# Patient Record
Sex: MALE | Race: WHITE | NOT HISPANIC OR LATINO | URBAN - METROPOLITAN AREA
[De-identification: names, ages, dates, MRNs, and addresses within clinical notes are randomized per-mention and may not be internally consistent; named-entity substitution may affect disease eponyms.]

---

## 2017-02-22 ENCOUNTER — OFFICE VISIT - RIVER FALLS (OUTPATIENT)
Dept: FAMILY MEDICINE | Facility: CLINIC | Age: 62
End: 2017-02-22

## 2017-02-22 ASSESSMENT — MIFFLIN-ST. JEOR: SCORE: 1686.41

## 2017-03-30 ENCOUNTER — OFFICE VISIT - RIVER FALLS (OUTPATIENT)
Dept: FAMILY MEDICINE | Facility: CLINIC | Age: 62
End: 2017-03-30

## 2017-03-30 ASSESSMENT — MIFFLIN-ST. JEOR: SCORE: 1674.61

## 2017-08-24 ENCOUNTER — OFFICE VISIT - RIVER FALLS (OUTPATIENT)
Dept: FAMILY MEDICINE | Facility: CLINIC | Age: 62
End: 2017-08-24

## 2017-08-24 ASSESSMENT — MIFFLIN-ST. JEOR: SCORE: 1669.17

## 2018-05-16 ENCOUNTER — OFFICE VISIT - RIVER FALLS (OUTPATIENT)
Dept: FAMILY MEDICINE | Facility: CLINIC | Age: 63
End: 2018-05-16

## 2018-05-16 ASSESSMENT — MIFFLIN-ST. JEOR: SCORE: 1661.01

## 2018-08-28 ENCOUNTER — OFFICE VISIT - RIVER FALLS (OUTPATIENT)
Dept: FAMILY MEDICINE | Facility: CLINIC | Age: 63
End: 2018-08-28

## 2018-08-28 ASSESSMENT — MIFFLIN-ST. JEOR: SCORE: 1692.76

## 2018-12-10 ENCOUNTER — OFFICE VISIT - RIVER FALLS (OUTPATIENT)
Dept: FAMILY MEDICINE | Facility: CLINIC | Age: 63
End: 2018-12-10

## 2019-05-14 ENCOUNTER — OFFICE VISIT - RIVER FALLS (OUTPATIENT)
Dept: FAMILY MEDICINE | Facility: CLINIC | Age: 64
End: 2019-05-14

## 2019-05-14 ASSESSMENT — MIFFLIN-ST. JEOR: SCORE: 1720.88

## 2019-11-18 ENCOUNTER — OFFICE VISIT - RIVER FALLS (OUTPATIENT)
Dept: FAMILY MEDICINE | Facility: CLINIC | Age: 64
End: 2019-11-18

## 2019-11-18 ASSESSMENT — MIFFLIN-ST. JEOR: SCORE: 1714.53

## 2020-08-03 ENCOUNTER — OFFICE VISIT - RIVER FALLS (OUTPATIENT)
Dept: FAMILY MEDICINE | Facility: CLINIC | Age: 65
End: 2020-08-03

## 2020-08-03 LAB
ALBUMIN UR-MCNC: NEGATIVE G/DL
BILIRUB UR QL STRIP: NEGATIVE
GLUCOSE UR STRIP-MCNC: NEGATIVE MG/DL
HGB UR QL STRIP: NEGATIVE
KETONES UR STRIP-MCNC: NEGATIVE MG/DL
LEUKOCYTE ESTERASE UR QL STRIP: NEGATIVE
NITRATE UR QL: NEGATIVE
PH UR STRIP: 6 [PH] (ref 5–8)
SP GR UR STRIP: 1.02 (ref 1–1.03)

## 2020-08-03 ASSESSMENT — MIFFLIN-ST. JEOR: SCORE: 1678.24

## 2022-02-11 VITALS
BODY MASS INDEX: 33.24 KG/M2 | WEIGHT: 211.8 LBS | HEART RATE: 68 BPM | DIASTOLIC BLOOD PRESSURE: 70 MMHG | HEIGHT: 67 IN | TEMPERATURE: 98.6 F | SYSTOLIC BLOOD PRESSURE: 110 MMHG

## 2022-02-11 VITALS
HEART RATE: 80 BPM | WEIGHT: 210.8 LBS | BODY MASS INDEX: 33.51 KG/M2 | SYSTOLIC BLOOD PRESSURE: 116 MMHG | DIASTOLIC BLOOD PRESSURE: 70 MMHG | TEMPERATURE: 97.3 F

## 2022-02-11 VITALS
WEIGHT: 203 LBS | OXYGEN SATURATION: 93 % | DIASTOLIC BLOOD PRESSURE: 66 MMHG | HEART RATE: 102 BPM | HEIGHT: 69 IN | BODY MASS INDEX: 30.07 KG/M2 | SYSTOLIC BLOOD PRESSURE: 102 MMHG

## 2022-02-11 VITALS
TEMPERATURE: 97.9 F | HEART RATE: 60 BPM | HEIGHT: 67 IN | SYSTOLIC BLOOD PRESSURE: 106 MMHG | WEIGHT: 209.2 LBS | BODY MASS INDEX: 32.83 KG/M2 | DIASTOLIC BLOOD PRESSURE: 72 MMHG

## 2022-02-11 VITALS
BODY MASS INDEX: 32.65 KG/M2 | DIASTOLIC BLOOD PRESSURE: 72 MMHG | TEMPERATURE: 96.8 F | WEIGHT: 208 LBS | HEART RATE: 60 BPM | SYSTOLIC BLOOD PRESSURE: 108 MMHG | HEIGHT: 67 IN | OXYGEN SATURATION: 98 %

## 2022-02-11 VITALS
TEMPERATURE: 97.6 F | HEIGHT: 69 IN | HEART RATE: 72 BPM | BODY MASS INDEX: 31.25 KG/M2 | SYSTOLIC BLOOD PRESSURE: 112 MMHG | DIASTOLIC BLOOD PRESSURE: 68 MMHG | WEIGHT: 211 LBS

## 2022-02-11 VITALS
TEMPERATURE: 97.6 F | BODY MASS INDEX: 31.46 KG/M2 | HEIGHT: 69 IN | SYSTOLIC BLOOD PRESSURE: 112 MMHG | WEIGHT: 212.4 LBS | HEART RATE: 58 BPM | DIASTOLIC BLOOD PRESSURE: 60 MMHG

## 2022-02-11 VITALS
DIASTOLIC BLOOD PRESSURE: 66 MMHG | WEIGHT: 206.2 LBS | OXYGEN SATURATION: 96 % | TEMPERATURE: 97.1 F | HEIGHT: 67 IN | SYSTOLIC BLOOD PRESSURE: 104 MMHG | BODY MASS INDEX: 32.36 KG/M2 | HEART RATE: 63 BPM

## 2022-02-11 VITALS
HEIGHT: 67 IN | HEART RATE: 66 BPM | TEMPERATURE: 96.7 F | SYSTOLIC BLOOD PRESSURE: 108 MMHG | WEIGHT: 213.2 LBS | BODY MASS INDEX: 33.46 KG/M2 | DIASTOLIC BLOOD PRESSURE: 70 MMHG

## 2022-02-16 NOTE — TELEPHONE ENCOUNTER
Order for EEG faxed to [facility], patient is aware they will contact him to schedule.

## 2022-02-16 NOTE — NURSING NOTE
Phone Message    PCP:   TIMIT      Time of Call:  10:39 am       Person Calling:  pt  Phone number:  [phone]    Returned call at: 10:46 am    Note:   Pt calls requesting a prescription for a fungus on his leg. Has been seen this spring for this rash, but it keeps coming back. States he had an RN look at is and she thinks it is a fungus and should have a differnet rx. Advised appt with CHT, transferred to schedule.

## 2022-02-16 NOTE — CARE COORDINATION
Patient:   JULY KEARNS JR            MRN: [id number]            FIN: [id number]               Age:   64 years     Sex:  Male     :  1955   Associated Diagnoses:   None   Author:   Brooke Tan CMA      Sources of Information:  [ ] Patient, family member, or caregiver (Please list):  [x ] Hospital discharge summary  [ ] Hospital fax  [ ] List of recent hospitalizations or ED visits  [ ] Other:     Discharged From: [facility]  Discharge Date: 5/10/19    Diagnosis/Problem: Syncope    Medication Changes: [ ] Yes [x ] No   Medication List Updated: [ ] Yes [ x] No  Hospital medication list reconciled with clinic medication list: Yes  Medications          *denotes recorded medication          *Aspir-Low 81 mg oral delayed release tablet: 81 mg, 1 tab(s), Oral, daily, 0 Refill(s).          Elocon 0.1% topical cream: 1 nishant, TOP, Daily, 30 g, 1 Refill(s).      Needs Referral or Lab: [ x] Yes [ ] No  Needs to get scheduled for outpatient EEG    Needs Follow-up Appointment:  [x ] Within 7 days of discharge (highly complex visit)  [ ] Within 14 days of discharge (moderately complex visit)    Appointment Made With: No appt yet  Date:     Called and spoke to pt-he had no concerns at this time. Discussed seeing CHT for f/u and to get scheduled for outpatient EEG. He agreed to see CHT and I have transferred him to schedule.F/u appointment completed with CHT  Magi Lopez CMA.

## 2022-02-16 NOTE — PROGRESS NOTES
Patient:   JULY KEARNS JR            MRN: [id number]            FIN: [id number]               Age:   64 years     Sex:  Male     :  1955   Associated Diagnoses:   Right otitis externa; Impacted cerumen, right ear   Author:   Yosef Pozo MD      Visit Information      Date of Service: 2019 10:17 am  Performing Location: [facility]  Encounter#: [id number]      Primary Care Provider (PCP):  Yosef Pozo MD    NPI# [id number]      Referring Provider:  Yosef Pozo MD    NPI# [id number]      Chief Complaint   2019 10:19 AM CST  Right ear pain since last thursday, Has been using ear wax removal kit, seems to have made it worse     Chief complaint and symptoms noted above confirmed with patient.      History of Present Illness             The patient presents with earache.  The location of the earache is the right ear.  The earache is characterized by pain.  The severity of the earache is moderate.  The earache is constant.  Exacerbating factors consist of movement.  Relieving factors consist of none.  Associated symptoms consist of none.        Review of Systems   Constitutional:  Negative.    Ear/Nose/Mouth/Throat:  Negative except as documented in history of present illness.    Respiratory:  Negative.    Cardiovascular:  Negative.       Health Status   Allergies:    Allergic Reactions (Selected)  No Known Medication Allergies   Problem list:    All Problems (Selected)  Episode of moderate major depression / SNOMED CT 47342938 / Confirmed  Obesity / ICD-9-.00 / Probable      Histories   Past Medical History:    Active  Obesity (ICD-9-.00)  Episode of moderate major depression (SNOMED CT 59217809)  Resolved  Inpatient stay (SNOMED CT 895444433): Onset on 2019 at 64 years.  Resolved on 5/10/2019 at 64 years.  Comments:  2019 CDT 7:19 AM CDT - Terri Brooks  @[facility], [place] - Syncope   Family History:    Cancer  Brother  [place] -  Cancer of colon  Grandfather (M)  Diverticulitis  Mother (Lucy)     Procedure history:    Colonoscopy (SNOMED CT 831451144) performed by MD Mao Ott on 4/25/2002 at 46 Years.  Comments:  10/17/2011 6:35 AM CDT - ShawVy hernandez  Repeat in 10 yrs.  Flexible sigmoidoscopy (SNOMED CT 83775280) performed by Buzz Santana MD on 3/9/2001 at 45 Years.  Carpal tunnel decompression (SNOMED CT 4290871735).   Social History:        Alcohol Assessment: Denies Alcohol Use            Current, Beer (12 oz), Liquor (Hard) (1.5 oz), Stopped age 34 Years.      Tobacco Assessment: Past            Past, OTHER, 20 year(s).  Started age 14 Years.  Stopped age 34 Years.      Employment and Education Assessment            Employed      Home and Environment Assessment            Marital status: Single.  Lives with Self.  1 children.  Living situation: Home/Independent.  Smoker in               household: No.      Nutrition and Health Assessment            Type of diet: Regular.      Exercise and Physical Activity Assessment            Exercise frequency: Daily.  Exercise type: Walking.        Physical Examination   Vital Signs   11/18/2019 10:19 AM CST Temperature Tympanic 97.6 DegF  LOW    Peripheral Pulse Rate 72 bpm    Pulse Site Radial artery    HR Method Manual    Systolic Blood Pressure 112 mmHg    Diastolic Blood Pressure 68 mmHg    Mean Arterial Pressure 83 mmHg    BP Site Left arm    BP Method Manual      Measurements from flowsheet : Measurements   11/18/2019 10:19 AM CST Height Measured - Standard 68.5 in    Weight Measured - Standard 211 lb    BSA 2.15 m2    Body Mass Index 31.61 kg/m2  HI      Vital Signs Stable per flow sheet .   General:  Alert and oriented, No acute distress.    HENT:  Normocephalic.         Ear: Right ear, Canal ( Impacted with cerumen, Erythematous, Pain with pinna motion ).    Neck:  Supple, Non-tender, No lymphadenopathy.    Respiratory:  Lungs are clear to auscultation, Respirations are non-labored.     Cardiovascular:  Normal rate, Regular rhythm.       Procedure   Ear foreign body removal procedure   Date/ Time:  11/18/2019 10:45:00 AM.     Confirmed: patient.     Performed by: self.     Informed consent: Verbally obtained.     Indication: hearing disturbance, impacted Cerumen.     Location: right ear.     Preparation and technique: positioned sitting upright, method including (cerumen loop, curette, irrigation with warm tap water, otologic syringe).     Results: foreign body removal complete.     Procedure tolerated: well.     No Complications.        Impression and Plan   Diagnosis     Right otitis externa (UAA74-OL H60.91).     Course:  Not improving.    Orders     Orders   Pharmacy:  hydrocortisone/neomycin/polymyxin B 1%-0.35%-10,000 units/mL otic suspension (Prescribe): 2 drop(s), Ear-Right, qid, x 10 day(s), # 10 mL, 0 Refill(s), Type: Acute, Pharmacy: [facility] , 2 drop(s) Ear-Right qid,x10 day(s).     Diagnosis     Impacted cerumen, right ear (SOQ95-KK H61.21).     Orders     F/U  if not improving, sooner if getting worse.

## 2022-02-16 NOTE — PROGRESS NOTES
"   Patient:   JULY KEARNS JR            MRN: [id number]            FIN: [id number]               Age:   62 years     Sex:  Male     :  1955   Associated Diagnoses:   Rash   Author:   Yosef Pozo MD      Chief Complaint   2017 4:27 PM CDT    Patient here for itchy \"fungus\" rash on bilateral legs, arms, and flank. Patient has tried hydroxyzine HCL without relief.     Chief complaint and symptoms noted above confirmed with patient.      History of Present Illness             The patient presents with rash.  The location of the rash is on the right, leg.  The severity of the symptom(s) associated to the rash is mild.  The timing/ course of symptom(s) related to the rash is intermittent.  Exacerbating factors consist of none.  Relieving factors consist of antihistamine.        Review of Systems   Constitutional:  Negative.    Integumentary:  Negative except as documented in history of present illness.       Health Status   Allergies:    Allergic Reactions (Selected)  No Known Medication Allergies   Medications:  (Selected)   Prescriptions  Prescribed  hydrOXYzine hydrochloride 25 mg oral tablet: 1 tab(s) ( 25 mg ), PO, QID, PRN: for itching, # 120 tab(s), 1 Refill(s), Type: Maintenance, Pharmacy: [facility] #[id number], 1 tab(s) po qid,x30 day(s),PRN:for itching   Problem list:    All Problems (Selected)  Episode of moderate major depression / ICD-9-.22 / Confirmed  Obesity / ICD-9-.00 / Probable      Histories   Past Medical History:    Active  Obesity (ICD-9-.00)   Family History:    Cancer  Brother  [place] - Cancer of colon  Grandfather (M)  Diverticulitis  Mother (Lucy)     Procedure history:    Colonoscopy (SNOMED CT 727186692) performed by MD Mao Ott on 2002 at 46 Years.  Comments:  10/17/2011 6:35 AM - Vy Squires  Repeat in 10 yrs.  Flexible sigmoidoscopy (SNOMED CT 60551406) performed by Buzz Santana MD on 3/9/2001 at 45 Years.  Carpal tunnel decompression (SNOMED CT " 8028196942).   Social History:        Alcohol Assessment: Denies Alcohol Use            Current, Beer (12 oz), Liquor (Hard) (1.5 oz), Stopped age 34 Years.      Tobacco Assessment: Past            Past, OTHER, 20 year(s).  Started age 14 Years.  Stopped age 34 Years.      Employment and Education Assessment            Employed      Home and Environment Assessment            Marital status: Single.  Lives with Self.  1 children.  Living situation: Home/Independent.  Smoker in               household: No.      Nutrition and Health Assessment            Type of diet: Regular.      Exercise and Physical Activity Assessment            Exercise frequency: Daily.  Exercise type: Walking.        Physical Examination   Vital Signs   8/24/2017 4:27 PM CDT Temperature Tympanic 96.8 DegF  LOW    Peripheral Pulse Rate 60 bpm    Systolic Blood Pressure 108 mmHg    Diastolic Blood Pressure 72 mmHg    Mean Arterial Pressure 84 mmHg    Oxygen Saturation 98 %      Measurements from flowsheet : Measurements   8/24/2017 4:27 PM CDT Height Measured - Standard 66.5 in    Weight Measured - Standard 208.0 lb    BSA 2.1 m2    Body Mass Index 33.07 kg/m2      General:  Alert and oriented, No acute distress.    Respiratory:  Lungs are clear to auscultation.    Cardiovascular:  Normal rate, Regular rhythm.    Integumentary:       Integumentary exam: Site # 1, Right, Leg, Scaly rash 8x8 cm.       Impression and Plan   Diagnosis     Rash (LYH80-XA R21).     Orders     Orders   Pharmacy:  Lotrisone 1%-0.05% topical cream (Prescribe): 1 nishant, TOP, BID, # 45 g, 0 Refill(s), Type: Maintenance, Pharmacy: [facility] #[id number], 1 nishant top bid.     Orders   Pharmacy:  hydrOXYzine hydrochloride 25 mg oral tablet (Prescribe): 1 tab(s) ( 25 mg ), PO, QID, x 30 day(s), PRN: for itching, # 120 tab(s), 1 Refill(s), Type: Maintenance, Pharmacy: [facility] #[id number], 1 tab(s) po qid,x30 day(s),PRN:for itching  hydrOXYzine hydrochloride 25 mg oral tablet  (Modify): 1 tab(s) ( 25 mg ), PO, QID, x 30 day(s), PRN: for itching, # 120 tab(s), 1 Refill(s), Type: Hard Stop, Pharmacy: [facility] #[id number].     Orders   Requests (Consults / Referrals):  Referral (Request) (Order): 8/24/2017 4:49 PM CDT, Referred to: Dermatology, Reason for referral: Rash on leg, Rash.

## 2022-02-16 NOTE — TELEPHONE ENCOUNTER
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   Returned call- pt says he has physical scheduled on Monday. Pt says a coworker told him that another coworker (does not work at some location- only comes to shop to  supplies) was exposed to COVID and was tested.    Pt says his boss told  him that coworker was tested 2.5 weeks ago and came back negative. pt wondering if he can still come in for his DOT physical. Pt says he never has never had direct contact with tested coworker. Told pt if he has had no contact and coworker was negative he can still come in for DOT.    Last office visit and reason:  11/18/19 Earache, [facility] Ear lavage

## 2022-02-16 NOTE — PROGRESS NOTES
Patient:   JULY KEARNS JR            MRN: [id number]            FIN: [id number]               Age:   63 years     Sex:  Male     :  1955   Associated Diagnoses:   TMJ tenderness, right   Author:   Herbie Sibley PA-C      Report Summary   Diagnosis  TMJ tenderness, right (CVK69-CU M26.621).  Patient Instructions   Visit Information   Visit type:  General concerns.    Accompanied by:  No one.    Source of history:  Self.    Referral source:  Self.    History limitation:  None.       Chief Complaint   12/10/2018 2:35 PM CST   ears crackling, achy and throbbing x 1 week        History of Present Illness             The patient presents with temporomandibular joint dysfunction.  The location of TMJ dysfunction symptom(s) is the right side of the jaw.  The TMJ dysfunction is characterized by jaw popping, jaw clicking and aching pain.  The severity of the symptom(s) of TMJ dysfunction is moderate.  The symptom(s) of TMJ dysfunction is episodic, fluctuates in intensity and is worsening.  The symptom(s) of TMJ dysfunction has lasted for 1 week(s).  No URI symptoms. Right sided. CC above noted and confirmed with the patient..        Review of Systems   Constitutional:  Negative.    Ear/Nose/Mouth/Throat:  Negative except as documented in history of present illness.    Musculoskeletal:  Negative except as documented in history of present illness.       Health Status   Allergies:    Allergic Reactions (All)  No Known Medication Allergies  Canceled/Inactive Reactions (All)  No known allergies   Medications:  (Selected)   Prescriptions  Prescribed  Elocon 0.1% topical cream: 1 nishant, TOP, Daily, # 30 g, 1 Refill(s), Type: Maintenance, Pharmacy: [facility] #[id number], 1 nishant Topical daily  Documented Medications  Documented  Aspir-Low 81 mg oral delayed release tablet: = 1 tab(s) ( 81 mg ), Oral, daily, 0 Refill(s), Type: Maintenance   Problem list:    All Problems (Selected)  Obesity / ICD-9-.00 / Probable  Episode of  moderate major depression / ICD-9-.22 / Confirmed      Histories   Past Medical History:    Active  Obesity (278.00)      Physical Examination   Vital Signs   12/10/2018 2:35 PM CST Temperature Tympanic 97.3 DegF  LOW    Peripheral Pulse Rate 80 bpm    Pulse Site Radial artery    HR Method Manual    Systolic Blood Pressure 116 mmHg    Diastolic Blood Pressure 70 mmHg    Mean Arterial Pressure 85 mmHg    BP Site Right arm    BP Method Manual      Measurements from flowsheet : Measurements   12/10/2018 2:35 PM CST   Weight Measured - Standard                210.8 lb     Eye:  Pupils are equal, round and reactive to light, Extraocular movements are intact, Normal conjunctiva.    HENT:  Normocephalic, Tympanic membranes are clear, Oral mucosa is moist, No pharyngeal erythema.    Musculoskeletal:  Right TMJ tenderness to palpation. No locking..       Impression and Plan   Diagnosis     TMJ tenderness, right (AED37-ZN M26.621).     Patient Instructions:       Counseled: Patient, Regarding diagnosis, Regarding medications, Activity.    Ice and or heat to jaw. soft foods. Mouth guard  FU PRN.

## 2022-02-16 NOTE — PROGRESS NOTES
Patient:   JULY KEARNS JR            MRN: [id number]            FIN: [id number]               Age:   61 years     Sex:  Male     :  1955   Associated Diagnoses:   Contact dermatitis   Author:   Herbie Sibley PA-C      Visit Information   Visit type:  General concerns.    Accompanied by:  No one.    Source of history:  Self.    Referral source:  Self.    History limitation:  None.       Chief Complaint   2017 9:24 AM CST    c/o red rash all over x 3 wks        History of Present Illness             The patient presents with rash.  The location of the rash is bilaterally on the, generalized.  The rash is described as swollen, red and itching.  The severity of the symptom(s) associated to the rash is moderate.  The timing/ course of symptom(s) related to the rash is intermittent and worsening.  The rash has lasted for 3 week(s).  Possible ringworm on legs. 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 Intense pruritus. Remodeling house. Exposure to housing materials. Much stress. No medications. No other new exposures. CC above noted and confirmed with the patient..        Review of Systems   Constitutional:  Negative except as documented in history of present illness.    Respiratory:  Negative.    Cardiovascular:  Negative.    Integumentary:  Negative except as documented in history of present illness.    Neurologic:  Negative except as documented in history of present illness.       Health Status   Allergies:    Allergic Reactions (All)  No known allergies   Problem list:    All Problems (Selected)  Episode of moderate major depression / ICD-9-.22 / Confirmed  Obesity / ICD-9-.00 / Probable   Medications:  (Selected)   Prescriptions  Prescribed  predniSONE 10 mg oral tablet: 4 tabs daily x 4, then 3 tabs daily x 4, then 2 tabs daily x 4, then 1 tab daily x 4., PO, Daily, # 40 tab(s), 0 Refill(s), Type: Maintenance, Pharmacy: [facility] #[id number], 4 tabs daily x 4, then 3 tabs daily x 4, then  2 tabs daily x 4, then 1 tab...  terbinafine 250 mg oral tablet: 1 tab(s) ( 250 mg ), PO, Daily, x 10 day(s), # 10 tab(s), 0 Refill(s), Type: Acute, Pharmacy: [facility] #[id number], 1 tab(s) po daily,x10 day(s)      Histories   Past Medical History:    Active  Obesity (278.00)   Family History:    Cancer  Brother  [place] - Cancer of colon  Grandfather (M)  Diverticulitis  Mother (Lucy)     Procedure history:    Colonoscopy (432094604) on 4/25/2002 at 46 Years.  Comments:  10/17/2011 6:35 AM - Vy Squires  Repeat in 10 yrs.  Flexible sigmoidoscopy (97539662) on 3/9/2001 at 45 Years.  Carpal tunnel decompression (8280594689).   Social History:        Alcohol Assessment: Denies Alcohol Use            Current, Beer (12 oz), Liquor (Hard) (1.5 oz), Stopped age 34 Years.      Tobacco Assessment: Past            Past, OTHER, 20 year(s).  Started age 14 Years.  Stopped age 34 Years.      Employment and Education Assessment            Employed      Home and Environment Assessment            Marital status: Single.  Lives with Self.  1 children.  Living situation: Home/Independent.  Smoker in               household: No.      Nutrition and Health Assessment            Type of diet: Regular.      Exercise and Physical Activity Assessment            Exercise frequency: Daily.  Exercise type: Walking.        Physical Examination   Vital Signs   2/22/2017 9:24 AM CST Temperature Temporal 98.6 DegF    Peripheral Pulse Rate 68 bpm    Pulse Site Radial artery    Systolic Blood Pressure 110 mmHg    Diastolic Blood Pressure 70 mmHg    Mean Arterial Pressure 83 mmHg    BP Site Right arm    BP Method Manual      Measurements from flowsheet : Measurements   2/22/2017 9:24 AM CST Height Measured - Standard 66.5 in    Weight Measured - Standard 211.8 lb    BSA 2.12 m2    Body Mass Index 33.67 kg/m2      General:  Alert and oriented, No acute distress.    Neck:  Supple, Non-tender, No lymphadenopathy.    Respiratory:  Respirations are  non-labored.    Cardiovascular:  Normal rate.    Integumentary:  Warm, Intact, Circular erythematous rash on legs with some scale. Erythematous papular rash on upper arms and trunk..    Psychiatric:  Cooperative, Appropriate mood & affect.       Impression and Plan   Diagnosis     Contact dermatitis (MRI13-WT L25.9).     Patient Instructions:       Counseled: Patient, Regarding diagnosis, Regarding medications, Activity, Verbalized understanding.    Orders     Orders (Selected)   Prescriptions  Prescribed  predniSONE 10 mg oral tablet: 4 tabs daily x 4, then 3 tabs daily x 4, then 2 tabs daily x 4, then 1 tab daily x 4., PO, Daily, # 40 tab(s), 0 Refill(s), Type: Maintenance, Pharmacy: [facility] #[id number], 4 tabs daily x 4, then 3 tabs daily x 4, then 2 tabs daily x 4, then 1 tab...  terbinafine 250 mg oral tablet: 1 tab(s) ( 250 mg ), PO, Daily, x 10 day(s), # 10 tab(s), 0 Refill(s), Type: Acute, Pharmacy: [facility] #[id number], 1 tab(s) po daily,x10 day(s).     RTC in one week if not improved and prior if worse.

## 2022-02-16 NOTE — LETTER
(Inserted Image. Unable to display)     [address] [place] [address]  [phone] (phone)    March 03, 2020    JULY KEARNS   [place], [place] [id number]    Dear JULY,      Thank you for selecting our practice as your care provider. Below you will find the results and recent diagnostic testings outcomes we have reviewed.      I was notified that we have not yet heard from you regarding scheduling an EEG to evaluate why you passed out last year.    If you are interested in having this test done, please call the clinic at [phone] and let us know and we will try to reschedule the exam.          Please contact my practice at the number listed above if you have any questions or concerns.     Sincerely,    Nohelia GRIMES, Yosef

## 2022-02-16 NOTE — NURSING NOTE
CAGE Assessment Entered On:  5/14/2019 11:28 AM CDT    Performed On:  5/14/2019 11:28 AM CDT by Ruthy Monae CMA               Assessment   Have you ever felt you should cut down on your drinking :   No   Have people annoyed you by criticizing your drinking :   No   Have you ever felt bad or guilty about your drinking :   No   Have you ever taken a drink first thing in the morning to steady your nerves or get rid of a hangover (Eye-opener) :   No   CAGE Score :   0    Ruthy Monae CMA - 5/14/2019 11:28 AM CDT

## 2022-02-16 NOTE — PROGRESS NOTES
Patient:   JULY KEARNS JR            MRN: [id number]            FIN: [id number]               Age:   63 years     Sex:  Male     :  1955   Associated Diagnoses:   Pneumonia   Author:   Yosef Pozo MD      Chief Complaint   2018 11:05 AM CDT   c/o cough, chest congestion, chills, headache, body aches, diarrhea x 3 weeks     Chief complaint and symptoms noted above confirmed with patient.      History of Present Illness             The patient presents with cough.  The cough is described as hacking and paroxysmal.  The severity of the cough is moderate.  The cough is constant.  The cough has lasted for 2 week(s).  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  Exacerbating factors consist of lying flat and recent illness.  Relieving factors consist of none.  Associated symptoms consist of chills, fever, denies chest pain, denies nasal congestion and denies rhinorrhea.        Review of Systems   Constitutional:  Negative except as documented in history of present illness.    Ear/Nose/Mouth/Throat:  Negative.    Respiratory:  Negative except as documented in history of present illness.    Cardiovascular:  Negative.       Health Status   Allergies:    Allergic Reactions (Selected)  No Known Medication Allergies   Medications:  (Selected)      Problem list:    All Problems  Episode of moderate major depression / ICD-9-.22 / Confirmed  Obesity / ICD-9-.00 / Probable  Inactive: Tobacco abuse / ICD-9-.1  Canceled: CLL (Chronic Lymphocytic Leukemia) / ICD-9-.10      Histories   Past Medical History:    Active  Obesity (ICD-9-.00)   Family History:    Cancer  Brother  [place] - Cancer of colon  Grandfather (M)  Diverticulitis  Mother (Lucy)     Procedure history:    Colonoscopy (SNOMED CT 554814498) performed by MD Mao Ott on 2002 at 46 Years.  Comments:  10/17/2011 6:35 AM - Vy Squires  Repeat in 10 yrs.  Flexible sigmoidoscopy (SNOMED CT 81726966) performed by Buzz Santana MD on  3/9/2001 at 45 Years.  Carpal tunnel decompression (SNOMED CT 8585075647).   Social History:        Alcohol Assessment: Denies Alcohol Use            Current, Beer (12 oz), Liquor (Hard) (1.5 oz), Stopped age 34 Years.      Tobacco Assessment: Past            Past, OTHER, 20 year(s).  Started age 14 Years.  Stopped age 34 Years.      Employment and Education Assessment            Employed      Home and Environment Assessment            Marital status: Single.  Lives with Self.  1 children.  Living situation: Home/Independent.  Smoker in               household: No.      Nutrition and Health Assessment            Type of diet: Regular.      Exercise and Physical Activity Assessment            Exercise frequency: Daily.  Exercise type: Walking.        Physical Examination   Vital Signs   5/16/2018 11:05 AM CDT Temperature Tympanic 97.1 DegF  LOW    Peripheral Pulse Rate 63 bpm    Pulse Site Radial artery    HR Method Electronic    Systolic Blood Pressure 104 mmHg    Diastolic Blood Pressure 66 mmHg    Mean Arterial Pressure 79 mmHg    BP Site Left arm    BP Method Manual    Oxygen Saturation 96 %      Measurements from flowsheet : Measurements   5/16/2018 11:05 AM CDT Height Measured - Standard 66.5 in    Weight Measured - Standard 206.2 lb    BSA 2.09 m2    Body Mass Index 32.78 kg/m2  HI      General:  Alert and oriented, No acute distress.    Eye:  Normal conjunctiva.    HENT:  Normocephalic, Oral mucosa is moist, No pharyngeal erythema.    Neck:  Supple, Non-tender.    Respiratory:       Breath sounds: Diminished.    Cardiovascular:  Normal rate, Regular rhythm.       Impression and Plan   Diagnosis     Pneumonia (ODH70-MM J15.9).     Course:  Not improving.    Orders     Orders   Pharmacy:  doxycycline hyclate 100 mg oral capsule (Prescribe): 1 cap(s) ( 100 mg ), PO, Daily, x 10 day(s), # 10 cap(s), 0 Refill(s), Type: Maintenance, Pharmacy: [id number], 1 cap(s) po daily,x10 day(s).     Symptomatic  Treatment.

## 2022-02-16 NOTE — NURSING NOTE
Comprehensive Intake Entered On:  8/3/2020 11:56 AM CDT    Performed On:  8/3/2020 11:45 AM CDT by Pascale Trejo CMA               Summary   Chief Complaint :   DOT Px and UA   Menstrual Status :   N/A   Weight Measured :   203.0 lb(Converted to: 203 lb 0 oz, 92.08 kg)    Height Measured :   68.5 in(Converted to: 5 ft 8 in, 173.99 cm)    Body Mass Index :   30.41 kg/m2 (HI)    Body Surface Area :   2.11 m2   Systolic Blood Pressure :   102 mmHg   Diastolic Blood Pressure :   66 mmHg   Mean Arterial Pressure :   78 mmHg   Peripheral Pulse Rate :   102 bpm (HI)    BP Site :   Right arm   BP Method :   Manual   HR Method :   Electronic   Oxygen Saturation :   93 % (LOW)    Pascale Trejo CMA - 8/3/2020 11:45 AM CDT   Health Status   Allergies Verified? :   Yes   Medication History Verified? :   Yes   Medical History Verified? :   Yes   Tobacco Use? :   Former smoker   Pascale Trejo CMA - 8/3/2020 11:45 AM CDT   Consents   Consent for Immunization Exchange :   Consent Granted   Consent for Immunizations to Providers :   Consent Granted   Pascale Trejo CMA - 8/3/2020 11:45 AM CDT   Meds / Allergies   (As Of: 8/3/2020 11:56:16 AM CDT)   Allergies (Active)   No Known Medication Allergies  Estimated Onset Date:   Unspecified ; Created By:   Nila Cantu CMA; Reaction Status:   Active ; Category:   Drug ; Substance:   No Known Medication Allergies ; Type:   Allergy ; Updated By:   Nila Cantu CMA; Reviewed Date:   8/3/2020 11:54 AM CDT        Medication List   (As Of: 8/3/2020 11:56:16 AM CDT)   No Known Home Medications     Pascale Trejo CMA - 8/3/2020 11:54:10 AM           Vision Testing POC   Eye, Left Visual Acuity :   20/40   Eye, Right Visual Acuity :   20/20   Eye, Bilateral Visual Acuity :   20/20   Pascale Trejo CMA - 8/3/2020 12:21 PM CDT   Corrective Lenses :   Glasses   Color Blind Correct Plates :   normal   Eye, Left with Correction Visual Acuity :   20/25   Eye, Right with Correction Visual Acuity :   20/13    Eye, Bilat w/Correction Visual Acuity :   20/13   Neil PRACHIWaldoPascale - 8/3/2020 11:45 AM CDT   ID Risk Screen   Recent Travel History :   No recent travel   Family Member Travel History :   No recent travel   Other Exposure to Infectious Disease :   Unknown   Pascale Trejo CMA - 8/3/2020 11:45 AM CDT   Social History   Social History   (As Of: 8/3/2020 11:56:16 AM CDT)   Alcohol:  Denies Alcohol Use      Current, Beer (12 oz), Liquor (Hard) (1.5 oz), Stopped age 34 Years.   (Last Updated: 6/16/2011 4:40:45 PM CDT by Ruthy Monae CMA)    Comments:  2/4/2014 9:51 AM - Radha Bradford: denies alcohol use   (Last Updated: 2/4/2014 9:51:46 AM CST by Radha Bradford)          Tobacco:  Past      Past, OTHER, 20 year(s).  Started age 14 Years.  Stopped age 34 Years.   (Last Updated: 6/16/2011 4:40:19 PM CDT by Ruthy Monae CMA)          Employment/School:        Employed   (Last Updated: 6/16/2011 4:40:53 PM CDT by Ruthy Monae CMA)          Home/Environment:        Marital status: Single.  Lives with Self.  1 children.  Living situation: Home/Independent.  Smoker in household: No.   (Last Updated: 6/16/2011 4:41:23 PM CDT by Ruthy Monae CMA)          Nutrition/Health:        Type of diet: Regular.   (Last Updated: 6/16/2011 4:41:32 PM CDT by Ruthy Monae CMA)          Exercise:        Exercise frequency: Daily.  Exercise type: Walking.   (Last Updated: 6/16/2011 4:41:46 PM CDT by Ruthy Monae CMA)

## 2022-02-16 NOTE — PROGRESS NOTES
Patient:   JULY KEARNS JR            MRN: [id number]            FIN: [id number]               Age:   65 years     Sex:  Male     :  1955   Associated Diagnoses:   's permit physical examination   Author:   Herbie Sibley PA-C      Visit Information      Date of Service: 2020 11:35 am  Performing Location: [facility]  Encounter#: [id number]      Primary Care Provider (PCP):  Yosef Pozo MD    NPI# [id number]      Referring Provider:  Herbie Sibley PA-C    NPI# [id number]   Visit type:  DOT exam.    Accompanied by:  No one.    Source of history:  Self.    Referral source:  Self.       Chief Complaint   8/3/2020 11:45 AM CDT    DOT Px and UA     Here for DOT exam.      Well Adult History   Well Adult History   See scanned DOT form for history..     Syncopal episode in May. Dehydration. Work up otherwise negative for seizure etc.      Review of Systems   Constitutional:  Negative.    Eye:  Negative.    Ear/Nose/Mouth/Throat:  Negative.    Respiratory:  Negative.    Cardiovascular:  Negative.    Gastrointestinal:  Negative.    Genitourinary:  Negative.    Hematology/Lymphatics:  Negative.    Endocrine:  Negative.    Immunologic:  Negative.    Musculoskeletal:  Negative.    Integumentary:  Negative.    Neurologic:  Negative.    Psychiatric:  Negative.    All other systems reviewed and negative      Health Status   Allergies:    Allergic Reactions (All)  No Known Medication Allergies  Canceled/Inactive Reactions (All)  No known allergies   Medications:  (Selected)      Problem list:    All Problems  Obesity / ICD-9-.00 / Probable  Episode of moderate major depression / SNOMED CT 98711403 / Confirmed  Inactive: Tobacco abuse / ICD-9-.1  Resolved: Inpatient stay / SNOMED CT 860640393  Canceled: CLL (Chronic Lymphocytic Leukemia) / ICD-9-.10      Histories   Past Medical History:    Active  Obesity (278.00)  Episode of moderate major depression  (56614503)  Resolved  Inpatient stay (667668372): Onset on 5/9/2019 at 64 years.  Resolved on 5/10/2019 at 64 years.  Comments:  5/14/2019 CDT 7:19 AM CDT - Terri Brooks  @[facility], [place] - Syncope   Family History:    Cancer  Brother  [place] - Cancer of colon  Grandfather (M)  Diverticulitis  Mother (Lucy)     Procedure history:    Colonoscopy (301731755) on 4/25/2002 at 46 Years.  Comments:  10/17/2011 6:35 AM CDT - Vy Squires  Repeat in 10 yrs.  Flexible sigmoidoscopy (56351339) on 3/9/2001 at 45 Years.  Carpal tunnel decompression (8204409898).   Social History:        Alcohol Assessment: Denies Alcohol Use            Current, Beer (12 oz), Liquor (Hard) (1.5 oz), Stopped age 34 Years.      Tobacco Assessment: Past            Past, OTHER, 20 year(s).  Started age 14 Years.  Stopped age 34 Years.      Employment and Education Assessment            Employed      Home and Environment Assessment            Marital status: Single.  Lives with Self.  1 children.  Living situation: Home/Independent.  Smoker in               household: No.      Nutrition and Health Assessment            Type of diet: Regular.      Exercise and Physical Activity Assessment            Exercise frequency: Daily.  Exercise type: Walking.        Physical Examination   Vital Signs   8/3/2020 11:45 AM CDT Peripheral Pulse Rate 102 bpm  HI    HR Method Electronic    Systolic Blood Pressure 102 mmHg    Diastolic Blood Pressure 66 mmHg    Mean Arterial Pressure 78 mmHg    BP Site Right arm    BP Method Manual    Oxygen Saturation 93 %  LOW      Measurements from flowsheet : Measurements   8/3/2020 11:45 AM CDT Height Measured - Standard 68.5 in    Weight Measured - Standard 203.0 lb    BSA 2.11 m2    Body Mass Index 30.41 kg/m2  HI      General:  Alert and oriented, No acute distress.    Eye:  Pupils are equal, round and reactive to light, Extraocular movements are intact, Normal conjunctiva, Vision unchanged.    HENT:   Normocephalic, Tympanic membranes are clear, Normal hearing, Oral mucosa is moist, No pharyngeal erythema.    Neck:  Supple, Non-tender, No lymphadenopathy.    Respiratory:  Lungs are clear to auscultation, Respirations are non-labored, Breath sounds are equal.    Cardiovascular:  Normal rate, Regular rhythm, No murmur.    Gastrointestinal:  Soft, Non-tender, Non-distended, Normal bowel sounds, No organomegaly.    Genitourinary:  No costovertebral angle tenderness.    Musculoskeletal:  Normal range of motion, Normal strength, No tenderness, Normal gait.    Integumentary:  Warm, Moist, No rash.    Neurologic:  Alert, Oriented, No focal deficits, Normal deep tendon reflexes.    Psychiatric:  Cooperative, Appropriate mood & affect.       Health Maintenance      Recommendations     Pending (in the next year)        OverDue           Colorectal Cancer Screen (Colonoscopy) (Male) due  04/25/12  and every 10  year(s)           Lipid Disorders Screen (Male) due  11/16/12  and every 1  year(s)           Depression Screen (Male) due  05/14/20  and every 1  year(s)           Alcohol Misuse Screen (Male) due  05/14/20  and every 1  year(s)        Due            Abdominal Aortic Aneurysm Screen (if hx of smoking) due  08/03/20  One-time only           Fall Risk Screen (Male) due  08/03/20  and every 1  year(s)           Hepatitis C Screen 5734-4691 (Male) due  08/03/20  One-time only           Lung Cancer Screen (Male) due  08/03/20  and every 1  year(s)           Pneumococcal Vaccine due  08/03/20  One-time only        Near Due            Influenza Vaccine near due  08/31/20  and every 1  year(s)        Due In Future            Tetanus Vaccine not due until  06/16/21  and every 10  year(s)     Satisfied (in the past 1 year)        Satisfied            Body Mass Index Check (Male) on  08/03/20.           Body Mass Index Check (Male) on  11/18/19.           High Blood Pressure Screen (Male) on  08/03/20.           High Blood  Pressure Screen (Male) on  11/18/19.           Obesity Screen and Counseling (Male) on  08/03/20.           Obesity Screen and Counseling (Male) on  11/18/19.           Tobacco Use Screen (Male) on  08/03/20.           Tobacco Use Screen (Male) on  11/18/19.          Review / Management   Results review:  See UA report..       Impression and Plan   Diagnosis     's permit physical examination (KMK55-OF Z02.4).     Cleared for two years.

## 2022-02-16 NOTE — NURSING NOTE
Comprehensive Intake Entered On:  5/14/2019 10:56 AM CDT    Performed On:  5/14/2019 10:49 AM CDT by Vonda Judge MA               Summary   Chief Complaint :   c/o pain on the left side right below rib cage, would like to talk about brain scan and the results and tests he had taken on friday in [place]   Menstrual Status :   N/A   Weight Measured :   212.4 lb(Converted to: 212 lb 6 oz, 96.34 kg)    Height Measured :   68.5 in(Converted to: 5 ft 8 in, 173.99 cm)    Body Mass Index :   31.82 kg/m2 (HI)    Body Surface Area :   2.16 m2   Systolic Blood Pressure :   112 mmHg   Diastolic Blood Pressure :   60 mmHg   Mean Arterial Pressure :   77 mmHg   Peripheral Pulse Rate :   58 bpm (LOW)    BP Site :   Right arm   Pulse Site :   Radial artery   BP Method :   Manual   HR Method :   Manual   Temperature Tympanic :   97.6 DegF(Converted to: 36.4 DegC)  (LOW)    Vonda Judge MA - 5/14/2019 10:49 AM CDT   Health Status   Allergies Verified? :   Yes   Medication History Verified? :   Yes   Medical History Verified? :   Yes   Pre-Visit Planning Status :   Completed   Tobacco Use? :   Former smoker   Vonda Judge MA - 5/14/2019 10:49 AM CDT   Consents   Consent for Immunization Exchange :   Consent Granted   Consent for Immunizations to Providers :   Consent Granted   Vonda Judge MA - 5/14/2019 10:49 AM CDT   Meds / Allergies   (As Of: 5/14/2019 10:56:03 AM CDT)   Allergies (Active)   No Known Medication Allergies  Estimated Onset Date:   Unspecified ; Created By:   Nila Cantu CMA; Reaction Status:   Active ; Category:   Drug ; Substance:   No Known Medication Allergies ; Type:   Allergy ; Updated By:   Nila Cantu CMA; Reviewed Date:   5/14/2019 10:52 AM CDT        Medication List   (As Of: 5/14/2019 10:56:03 AM CDT)   Prescription/Discharge Order    mometasone topical  :   mometasone topical ; Status:   Processing ; Ordered As Mnemonic:   Elocon 0.1% topical cream ; Ordering Provider:   Toñito GARCIA  Herbie; Action Display:   Complete ; Catalog Code:   mometasone topical ; Order Dt/Tm:   5/14/2019 10:55:53 AM            Home Meds    aspirin  :   aspirin ; Status:   Processing ; Ordered As Mnemonic:   Aspir-Low 81 mg oral delayed release tablet ; Action Display:   Complete ; Catalog Code:   aspirin ; Order Dt/Tm:   5/14/2019 10:55:53 AM

## 2022-02-16 NOTE — PROGRESS NOTES
Patient:   JULY KEARNS JR            MRN: [id number]            FIN: [id number]               Age:   63 years     Sex:  Male     :  1955   Associated Diagnoses:   's permit physical examination   Author:   Herbie Sibley PA-C      Visit Information      Date of Service: 2018 03:00 pm  Performing Location: [facility]  Encounter#: [id number]      Primary Care Provider (PCP):  Yosef Pozo MD    NPI# [id number]      Referring Provider:  Herbie Sibley PA-C    NPI# [id number]   Visit type:  DOT exam.    Accompanied by:  No one.    Source of history:  Self.    Referral source:  Self.       Chief Complaint   2018 3:29 PM CDT    DOT- Physical     Here for DOT exam.      Well Adult History   Well Adult History   See scanned DOT form for history..        Review of Systems   Constitutional:  Negative.    Eye:  Negative.    Ear/Nose/Mouth/Throat:  Negative.    Respiratory:  Negative.    Cardiovascular:  Negative.    Gastrointestinal:  Negative.    Genitourinary:  Negative.    Hematology/Lymphatics:  Negative.    Endocrine:  Negative.    Immunologic:  Negative.    Musculoskeletal:  Negative.    Integumentary:  Negative.    Neurologic:  Negative.    Psychiatric:  Negative.    All other systems reviewed and negative      Health Status   Allergies:    Allergic Reactions (All)  No Known Medication Allergies  Canceled/Inactive Reactions (All)  No known allergies   Medications:  (Selected)   Documented Medications  Documented  Aspir-Low 81 mg oral delayed release tablet: = 1 tab(s) ( 81 mg ), Oral, daily, 0 Refill(s), Type: Maintenance   Problem list:    All Problems  Episode of moderate major depression / ICD-9-.22 / Confirmed  Obesity / ICD-9-.00 / Probable  Inactive: Tobacco abuse / ICD-9-.1  Canceled: CLL (Chronic Lymphocytic Leukemia) / ICD-9-.10      Histories   Past Medical History:    Active  Obesity (278.00)   Family History:    Cancer  Brother  [place] -  Cancer of colon  Grandfather (M)  Diverticulitis  Mother (Lucy)     Procedure history:    Colonoscopy (203970899) on 4/25/2002 at 46 Years.  Comments:  10/17/2011 6:35 AM - ShawVy hernandez  Repeat in 10 yrs.  Flexible sigmoidoscopy (77020250) on 3/9/2001 at 45 Years.  Carpal tunnel decompression (2920361883).   Social History:        Alcohol Assessment: Denies Alcohol Use            Current, Beer (12 oz), Liquor (Hard) (1.5 oz), Stopped age 34 Years.      Tobacco Assessment: Past            Past, OTHER, 20 year(s).  Started age 14 Years.  Stopped age 34 Years.      Employment and Education Assessment            Employed      Home and Environment Assessment            Marital status: Single.  Lives with Self.  1 children.  Living situation: Home/Independent.  Smoker in               household: No.      Nutrition and Health Assessment            Type of diet: Regular.      Exercise and Physical Activity Assessment            Exercise frequency: Daily.  Exercise type: Walking.        Physical Examination   Vital Signs   8/28/2018 3:29 PM CDT Temperature Tympanic 96.7 DegF  LOW    Peripheral Pulse Rate 66 bpm    Pulse Site Radial artery    HR Method Manual    Systolic Blood Pressure 108 mmHg    Diastolic Blood Pressure 70 mmHg    Mean Arterial Pressure 83 mmHg    BP Site Right arm    BP Method Manual      Measurements from flowsheet : Measurements   8/28/2018 3:29 PM CDT Height Measured - Standard 66.5 in    Weight Measured - Standard 213.2 lb    BSA 2.13 m2    Body Mass Index 33.89 kg/m2  HI      General:  Alert and oriented, No acute distress.    Eye:  Pupils are equal, round and reactive to light, Extraocular movements are intact, Normal conjunctiva, Vision unchanged.    HENT:  Normocephalic, Tympanic membranes are clear, Normal hearing, Oral mucosa is moist, No pharyngeal erythema.    Neck:  Supple, Non-tender, No lymphadenopathy.    Respiratory:  Lungs are clear to auscultation, Respirations are non-labored,  Breath sounds are equal.    Cardiovascular:  Normal rate, Regular rhythm, No murmur.    Gastrointestinal:  Soft, Non-tender, Non-distended, Normal bowel sounds, No organomegaly.    Genitourinary:  No costovertebral angle tenderness.    Musculoskeletal:  Normal range of motion, Normal strength, No tenderness, Normal gait.    Integumentary:  Warm, Moist, No rash.    Neurologic:  Alert, Oriented, No focal deficits, Normal deep tendon reflexes.    Psychiatric:  Cooperative, Appropriate mood & affect.       Health Maintenance      Recommendations     Pending (in the next year)        OverDue           Colorectal Cancer Screen (Colonoscopy) (Male) due  04/25/12  and every 10  year(s)           Lipid Disorders Screen (Male) due  11/16/12  and every 1  year(s)           Influenza Vaccine due  10/09/15  and every 1  year(s)           Alcohol Misuse Screen (Male) due  10/09/15  and every 1  year(s)           Depression Screen (Male) due  10/09/15  and every 1  year(s)        Due            Hepatitis C Screen 6450-6539 (Male) due  08/28/18  One-time only           Lung Cancer Screen (Male) due  08/28/18  and every 1  year(s)     Satisfied (in the past 1 year)        Satisfied            Aspirin Therapy for Prevention of CVD (Male) on  08/28/18.           Body Mass Index Check (Male) on  08/28/18.           Body Mass Index Check (Male) on  05/16/18.           High Blood Pressure Screen (Male) on  08/28/18.           High Blood Pressure Screen (Male) on  05/16/18.           Obesity Screen and Counseling (Male) on  08/28/18.           Obesity Screen and Counseling (Male) on  05/16/18.           Tobacco Use Screen (Male) on  08/28/18.           Tobacco Use Screen (Male) on  05/16/18.          Review / Management   Results review:  See UA report..       Impression and Plan   Diagnosis     's permit physical examination (ZWU56-HL Z02.4).     Cleared for two years.

## 2022-02-16 NOTE — NURSING NOTE
Comprehensive Intake Entered On:  11/18/2019 10:24 AM CST    Performed On:  11/18/2019 10:19 AM CST by Bailey Rhoades MA               Summary   Weight Measured :   211 lb(Converted to: 211 lb 0 oz, 95.71 kg)    Body Mass Index :   31.61 kg/m2 (HI)    Body Surface Area :   2.15 m2   Bailey Rhoades MA - 11/18/2019 10:25 AM CST   Chief Complaint :   Right ear pain since last thursday, Has been using ear wax removal kit, seems to have made it worse   Menstrual Status :   N/A   Height Measured :   68.5 in(Converted to: 5 ft 8 in, 173.99 cm)    Systolic Blood Pressure :   112 mmHg   Diastolic Blood Pressure :   68 mmHg   Mean Arterial Pressure :   83 mmHg   Peripheral Pulse Rate :   72 bpm   BP Site :   Left arm   Pulse Site :   Radial artery   BP Method :   Manual   HR Method :   Manual   Temperature Tympanic :   97.6 DegF(Converted to: 36.4 DegC)  (LOW)    Bailey Rhoades MA - 11/18/2019 10:19 AM CST   Health Status   Allergies Verified? :   Yes   Medication History Verified? :   Yes   Medical History Verified? :   Yes   Pre-Visit Planning Status :   Completed   Tobacco Use? :   Former smoker   Bailey Rhoades MA - 11/18/2019 10:19 AM CST   Consents   Consent for Immunization Exchange :   Consent Granted   Consent for Immunizations to Providers :   Consent Granted   Bailey Rhoades MA - 11/18/2019 10:19 AM CST   Meds / Allergies   (As Of: 11/18/2019 10:24:38 AM CST)   Allergies (Active)   No Known Medication Allergies  Estimated Onset Date:   Unspecified ; Created By:   Nila Cantu CMA; Reaction Status:   Active ; Category:   Drug ; Substance:   No Known Medication Allergies ; Type:   Allergy ; Updated By:   Nila Cantu CMA; Reviewed Date:   11/18/2019 10:19 AM CST        Medication List   (As Of: 11/18/2019 10:24:38 AM CST)   No Known Home Medications     Bailey Rhoades MA - 11/18/2019 10:19:52 AM

## 2022-02-16 NOTE — PROGRESS NOTES
Patient:   JULY KEARNS JR            MRN: [id number]            FIN: [id number]               Age:   61 years     Sex:  Male     :  1955   Associated Diagnoses:   Dermatitis   Author:   Yosef Pozo MD      Chief Complaint   3/30/2017 4:10 PM CDT    f/u rash - not improving     Chief complaint and symptoms noted above confirmed with patient.      History of Present Illness             The patient presents with rash.  The location of the rash is over the entire, body, not scalp, not head, not face.  Exacerbating factors consist of none.  Relieving factors consist of prednisone.  Associated symptoms consist of none.        Review of Systems   Constitutional:  Negative.    Respiratory:  Negative.    Cardiovascular:  Negative.    Integumentary:  Negative except as documented in history of present illness.       Health Status   Allergies:    Allergic Reactions (Selected)  No Known Medication Allergies   Medications:  (Selected)   Prescriptions  Prescribed  hydrOXYzine hydrochloride 25 mg oral tablet: 1 tab(s) ( 25 mg ), PO, QID, PRN: for itching, # 120 tab(s), 1 Refill(s), Type: Maintenance, Pharmacy: [facility] #[id number], 1 tab(s) po qid,x30 day(s),PRN:for itching   Problem list:    All Problems (Selected)  Episode of moderate major depression / ICD-9-.22 / Confirmed  Obesity / ICD-9-.00 / Probable      Histories   Past Medical History:    Active  Obesity (ICD-9-.00)   Family History:    Cancer  Brother  [place] - Cancer of colon  Grandfather (M)  Diverticulitis  Mother (Lucy)     Procedure history:    Colonoscopy (SNOMED CT 554680521) performed by MD Mao Ott on 2002 at 46 Years.  Comments:  10/17/2011 6:35 AM - Vy Squires  Repeat in 10 yrs.  Flexible sigmoidoscopy (SNOMED CT 90850091) performed by Buzz Santana MD on 3/9/2001 at 45 Years.  Carpal tunnel decompression (SNOMED CT 1828814074).   Social History:        Alcohol Assessment: Denies Alcohol Use            Current,  Beer (12 oz), Liquor (Hard) (1.5 oz), Stopped age 34 Years.      Tobacco Assessment: Past            Past, OTHER, 20 year(s).  Started age 14 Years.  Stopped age 34 Years.      Employment and Education Assessment            Employed      Home and Environment Assessment            Marital status: Single.  Lives with Self.  1 children.  Living situation: Home/Independent.  Smoker in               household: No.      Nutrition and Health Assessment            Type of diet: Regular.      Exercise and Physical Activity Assessment            Exercise frequency: Daily.  Exercise type: Walking.        Physical Examination   Vital Signs   3/30/2017 4:10 PM CDT Temperature Tympanic 97.9 DegF    Peripheral Pulse Rate 60 bpm    Pulse Site Radial artery    HR Method Manual    Systolic Blood Pressure 106 mmHg    Diastolic Blood Pressure 72 mmHg    Mean Arterial Pressure 83 mmHg    BP Site Left arm    BP Method Manual      Measurements from flowsheet : Measurements   3/30/2017 4:10 PM CDT Height Measured - Standard 66.5 in    Weight Measured - Standard 209.2 lb    BSA 2.11 m2    Body Mass Index 33.26 kg/m2      General:  Alert and oriented, No acute distress.    Integumentary:  Warm, Dry, Pink.         Integumentary exam: Rash ( Characteristics ( Blanching, Inflamed, Raised ), Color ( Red ), Consistent with ( An allergic reaction ) ).    Psychiatric:  Cooperative.         Mood and affect: Anxious.       Impression and Plan   Diagnosis     Dermatitis ([id number] L30.8).     Course:  Felt to be stress related, Not improving.    Orders     Orders (Selected)   Prescriptions  Prescribed  hydrOXYzine hydrochloride 25 mg oral tablet: 1 tab(s) ( 25 mg ), PO, QID, PRN: for itching, # 120 tab(s), 1 Refill(s), Type: Maintenance, Pharmacy: [id number], 1 tab(s) po qid,x30 day(s),PRN:for itching.     Orders     F/U  if not improving, sooner if getting worse.

## 2022-02-16 NOTE — TELEPHONE ENCOUNTER
Order, demographics, and note sent to [facility] who will contact patient to schedule.Per Veronique at [facility] Surgery Scheduling, patient canceled his Colonoscopy and did not want to reschedule.

## 2022-02-16 NOTE — NURSING NOTE
Depression Screening Entered On:  5/14/2019 11:28 AM CDT    Performed On:  5/14/2019 11:27 AM CDT by Ruthy Monae CMA               Depression Screening   Little Interest - Pleasure in Activities :   Not at all   Feeling Down, Depressed, Hopeless :   Not at all   Initial Depression Screen Score :   0    Trouble Falling or Staying Asleep :   Not at all   Feeling Tired or Little Energy :   Several days   Poor Appetite or Overeating :   Not at all   Feeling Bad About Yourself :   Not at all   Trouble Concentrating :   Not at all   Moving or Speaking Slowly :   Not at all   Thoughts Better Off Dead or Hurting Self :   Not at all   Detailed Depression Screen Score :   1    Total Depression Screen Score :   1    ANTHONY Difficulty with Work, Home, Others :   Not difficult at all   Ruthy Monae CMA - 5/14/2019 11:27 AM CDT

## 2022-02-16 NOTE — PROGRESS NOTES
Patient:   JULY KEARNS JR            MRN: [id number]            FIN: [id number]               Age:   64 years     Sex:  Male     :  1955   Associated Diagnoses:   Syncope   Author:   Yosef Pozo MD      Chief Complaint   2019 10:49 AM CDT   c/o pain on the left side right below rib cage, would like to talk about brain scan and the results and tests he had taken on friday in [place]     Chief complaint and symptoms noted above confirmed with patient.      History of Present Illness             The patient presents with syncope.  The syncopal episode lasted for an unknown duration of time.  Symptoms preceding the syncopal episode consist of none.  Events associated to the syncope a loss of consciousness and 30 seconds.  Injury associated to the syncopal episode consists of none.  Exacerbating factors consist of none.  Relieving factors consist of none.  Associated symptoms consist of none.  Was seen in [place].        Review of Systems   Constitutional:  Negative.    Eye:  Negative.    Ear/Nose/Mouth/Throat:  Negative.    Respiratory:  Negative.    Cardiovascular:  Negative.    Gastrointestinal:  Negative.    Genitourinary:  Negative.    Hematology/Lymphatics:  Negative.    Endocrine:  Negative.    Musculoskeletal:  Negative except as documented in history of present illness.    Integumentary:  Negative.    Neurologic:  Negative.    Psychiatric:  Negative.       Health Status   Allergies:    Allergic Reactions (Selected)  No Known Medication Allergies   Problem list:    All Problems (Selected)  Episode of moderate major depression / SNOMED CT 57791841 / Confirmed  Obesity / ICD-9-.00 / Probable      Histories   Past Medical History:    Active  Obesity (ICD-9-.00)  Episode of moderate major depression (SNOMED CT 06312489)  Resolved  Inpatient stay (SNOMED CT 687573555): Onset on 2019 at 64 years.  Resolved on 5/10/2019 at 64 years.  Comments:  2019 CDT 7:19 AM MARIET - Todd  Terri  @[facility], [place] - Syncope   Family History:    Cancer  Brother  [place] - Cancer of colon  Grandfather (M)  Diverticulitis  Mother (Lucy)        Physical Examination   Vital Signs   5/14/2019 10:49 AM CDT Temperature Tympanic 97.6 DegF  LOW    Peripheral Pulse Rate 58 bpm  LOW    Pulse Site Radial artery    HR Method Manual    Systolic Blood Pressure 112 mmHg    Diastolic Blood Pressure 60 mmHg    Mean Arterial Pressure 77 mmHg    BP Site Right arm    BP Method Manual      Measurements from flowsheet : Measurements   5/14/2019 10:49 AM CDT Height Measured - Standard 68.5 in    Weight Measured - Standard 212.4 lb    BSA 2.16 m2    Body Mass Index 31.82 kg/m2  HI      Vital Signs Stable per flow sheet .   General:  Alert and oriented, No acute distress.    Eye:  Normal conjunctiva.    HENT:  Normocephalic, Tympanic membranes are clear, Normal hearing, Oral mucosa is moist, No pharyngeal erythema.    Neck:  Supple, Non-tender, No carotid bruit, No lymphadenopathy, No thyromegaly.    Respiratory:  Lungs are clear to auscultation, Respirations are non-labored, Breath sounds are equal, Symmetrical chest wall expansion.    Cardiovascular:  Normal rate, Regular rhythm, No murmur, No edema.    Gastrointestinal:  Soft, Non-tender, Non-distended, Normal bowel sounds, No organomegaly, no pain.    Genitourinary:  No costovertebral angle tenderness.    Musculoskeletal:  Normal range of motion, Normal strength, No tenderness.    Integumentary:  Warm, Dry, Pink, No rash.    Neurologic:  Alert, Oriented, Normal sensory, Normal motor function, No focal deficits, Cranial Nerves II-XII are grossly intact, Normal deep tendon reflexes.    Psychiatric:  Cooperative, Appropriate mood & affect, Normal judgment, Non-suicidal.       Impression and Plan   Diagnosis     Syncope ([id number] R55).     Course:  Progressing as expected.    Patient Instructions:       Counseled: Patient, Regarding treatment, Verbalized  understanding.    Orders     Orders   Requests (Diagnostic Tests):  EEG (Request) (Order): Instructions: Outpatient, Syncope.        Professional Services   Counseling Summary:  This was a 25 minute visit with greater than 50% of that time spent counseling the patient, and coordination of care..    Counseling included treatment options, and current condition.    The patient was interactive, attentive, asked questions, and verbalized understanding.